# Patient Record
Sex: FEMALE | Race: WHITE | NOT HISPANIC OR LATINO
[De-identification: names, ages, dates, MRNs, and addresses within clinical notes are randomized per-mention and may not be internally consistent; named-entity substitution may affect disease eponyms.]

---

## 2017-03-14 ENCOUNTER — APPOINTMENT (OUTPATIENT)
Dept: NEUROLOGY | Facility: CLINIC | Age: 62
End: 2017-03-14

## 2017-04-03 ENCOUNTER — APPOINTMENT (OUTPATIENT)
Dept: NEUROLOGY | Facility: CLINIC | Age: 62
End: 2017-04-03

## 2017-04-03 VITALS
DIASTOLIC BLOOD PRESSURE: 72 MMHG | HEART RATE: 79 BPM | WEIGHT: 144 LBS | SYSTOLIC BLOOD PRESSURE: 117 MMHG | HEIGHT: 66 IN | BODY MASS INDEX: 23.14 KG/M2

## 2017-04-03 DIAGNOSIS — R13.10 DYSPHAGIA, UNSPECIFIED: ICD-10-CM

## 2017-05-01 ENCOUNTER — OTHER (OUTPATIENT)
Age: 62
End: 2017-05-01

## 2017-06-07 ENCOUNTER — MEDICATION RENEWAL (OUTPATIENT)
Age: 62
End: 2017-06-07

## 2017-07-07 ENCOUNTER — APPOINTMENT (OUTPATIENT)
Dept: NEUROLOGY | Facility: CLINIC | Age: 62
End: 2017-07-07

## 2017-07-07 VITALS
HEART RATE: 86 BPM | SYSTOLIC BLOOD PRESSURE: 129 MMHG | WEIGHT: 137 LBS | DIASTOLIC BLOOD PRESSURE: 66 MMHG | BODY MASS INDEX: 22.02 KG/M2 | HEIGHT: 66 IN

## 2017-08-07 LAB
FOLATE SERPL-MCNC: 13.6 NG/ML
TSH SERPL-ACNC: 4.29 UIU/ML
VIT B12 SERPL-MCNC: 849 PG/ML

## 2017-11-07 ENCOUNTER — APPOINTMENT (OUTPATIENT)
Dept: NEUROLOGY | Facility: CLINIC | Age: 62
End: 2017-11-07
Payer: COMMERCIAL

## 2017-11-07 PROCEDURE — 99214 OFFICE O/P EST MOD 30 MIN: CPT

## 2017-11-07 RX ORDER — MELOXICAM 15 MG/1
15 TABLET ORAL
Qty: 30 | Refills: 0 | Status: DISCONTINUED | COMMUNITY
Start: 2017-05-10 | End: 2017-11-07

## 2018-01-18 ENCOUNTER — OTHER (OUTPATIENT)
Age: 63
End: 2018-01-18

## 2018-04-11 ENCOUNTER — CLINICAL ADVICE (OUTPATIENT)
Age: 63
End: 2018-04-11

## 2018-05-11 ENCOUNTER — APPOINTMENT (OUTPATIENT)
Dept: NEUROLOGY | Facility: CLINIC | Age: 63
End: 2018-05-11
Payer: COMMERCIAL

## 2018-05-11 DIAGNOSIS — G31.9 DEGENERATIVE DISEASE OF NERVOUS SYSTEM, UNSPECIFIED: ICD-10-CM

## 2018-05-11 PROCEDURE — 99214 OFFICE O/P EST MOD 30 MIN: CPT | Mod: 25

## 2018-05-11 PROCEDURE — 64611 CHEMODENERV SALIV GLANDS: CPT

## 2018-05-25 ENCOUNTER — TRANSCRIPTION ENCOUNTER (OUTPATIENT)
Age: 63
End: 2018-05-25

## 2018-06-04 DIAGNOSIS — M54.5 LOW BACK PAIN: ICD-10-CM

## 2018-07-03 ENCOUNTER — APPOINTMENT (OUTPATIENT)
Dept: NEUROLOGY | Facility: CLINIC | Age: 63
End: 2018-07-03
Payer: COMMERCIAL

## 2018-07-03 PROCEDURE — 99214 OFFICE O/P EST MOD 30 MIN: CPT

## 2018-11-05 ENCOUNTER — MEDICATION RENEWAL (OUTPATIENT)
Age: 63
End: 2018-11-05

## 2018-11-05 ENCOUNTER — APPOINTMENT (OUTPATIENT)
Dept: NEUROLOGY | Facility: CLINIC | Age: 63
End: 2018-11-05
Payer: COMMERCIAL

## 2018-11-05 VITALS
WEIGHT: 126 LBS | HEART RATE: 80 BPM | DIASTOLIC BLOOD PRESSURE: 75 MMHG | HEIGHT: 66 IN | SYSTOLIC BLOOD PRESSURE: 125 MMHG | BODY MASS INDEX: 20.25 KG/M2

## 2018-11-05 PROCEDURE — 99214 OFFICE O/P EST MOD 30 MIN: CPT

## 2019-03-27 ENCOUNTER — APPOINTMENT (OUTPATIENT)
Dept: OPHTHALMOLOGY | Facility: CLINIC | Age: 64
End: 2019-03-27
Payer: COMMERCIAL

## 2019-03-27 ENCOUNTER — APPOINTMENT (OUTPATIENT)
Dept: NEUROLOGY | Facility: CLINIC | Age: 64
End: 2019-03-27
Payer: COMMERCIAL

## 2019-03-27 VITALS
HEIGHT: 66 IN | SYSTOLIC BLOOD PRESSURE: 134 MMHG | BODY MASS INDEX: 20.73 KG/M2 | HEART RATE: 91 BPM | WEIGHT: 129 LBS | DIASTOLIC BLOOD PRESSURE: 79 MMHG

## 2019-03-27 DIAGNOSIS — K11.7 DISTURBANCES OF SALIVARY SECRETION: ICD-10-CM

## 2019-03-27 DIAGNOSIS — F41.8 OTHER SPECIFIED ANXIETY DISORDERS: ICD-10-CM

## 2019-03-27 PROCEDURE — 99214 OFFICE O/P EST MOD 30 MIN: CPT

## 2019-03-27 PROCEDURE — ZZZZZ: CPT

## 2019-03-27 PROCEDURE — 92083 EXTENDED VISUAL FIELD XM: CPT

## 2019-03-27 PROCEDURE — 99204 OFFICE O/P NEW MOD 45 MIN: CPT

## 2019-03-27 RX ORDER — RIVASTIGMINE TARTRATE 1.5 MG/1
1.5 CAPSULE ORAL
Qty: 60 | Refills: 5 | Status: DISCONTINUED | COMMUNITY
Start: 2018-11-05 | End: 2019-03-27

## 2019-03-27 RX ORDER — BUPROPION HYDROCHLORIDE 150 MG/1
150 TABLET, FILM COATED, EXTENDED RELEASE ORAL
Qty: 60 | Refills: 5 | Status: DISCONTINUED | COMMUNITY
Start: 2018-07-03 | End: 2019-03-27

## 2019-03-27 NOTE — DISCUSSION/SUMMARY
[FreeTextEntry1] : In summary, the patient is a 63-year-old right-handed woman with a 5 year history of poor balance and falling. The examination was significant for minimal symmetric parkinsonism, minimal ataxia, except for cerebellar gait, and mildly slurred speech. 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 Other considerations include paraneoplastic cerebellar atrophy, vitamin E deficiency, and possible spinocerebellar ataxia, given the hyperreflexia, but all workup so negative. . Paraneoplastic panel was negative, vitamine E was normal, CT of body was normal, and she gets regular mammograms (family hx of breast CT). Time course also argues against paraneoplastic.\par \par 1. Continue PT for balance, speech therapy\par 2. Genetics referral\par 3. Neuropsychological testing done.\par 4. Follow up on long-term hx of thyroid cancer.

## 2019-03-27 NOTE — HISTORY OF PRESENT ILLNESS
[FreeTextEntry1] : The patient is a 64-year-old right-handed woman who comes in for another opinion on cerebellar atrophy and imbalance. Her problems began about 5 years ago when she fell down the stairs and broke her ankle. Since then, she has had frequent falls, in all directions. These are not preceded by lightheadedness or loss of consciousness. Indeed, she does not have lightheadedness when standing up. Her gait has become more wide based. Recently her speech has become slurred though she has no dysphagia. Handwriting is poor, but she has no trouble with eating, dressing, or other activities of daily living. Her legs shake when she is nervous but there is no other tremor. She has no numbness or weakness. She does not act out her dreams but does yell and gasp before those yells. She has no constipation and no urinary incontinence, except mild urgency. She has poor night vision. She has no heart disease or diabetes.\par She has a nephew with multiple sclerosis but no family history of cerebellar disease or other movement disorders.\par \par Additional history - aunt noticed wide-based gait many years ago\par \par 1. Radha scan 6/15/16 normal, I reviewed images. Vitamin E was normal, Mammogram 4/5/16, has Rx to repeat - normal. CT chest/abd/pelvis unremarkable, Paraneoplastic panel 10/24/15 - negative.  SPEP, endomysial antibodias, tTG, PTH normal. Speech is more impaired. No orthostasis. No incontinence. More coughing with food/drinks. Had evaluation, no aspiration.  Getting speech therapy. More trouble without walking. \par 2. Saw Dr Meza - sleep study at [place] consistent with RBD, repeat scheduled.. Never took anything. Daughter hears her yelling. Repeat study (Dr Meza) did not show RBD, perhaps mild sleep apnea. Saw Dr Arellano today, vision issues not related to her cerebellum.\par 3. She is having vision issues, switched paxil to welbutrin, did not change vision, and wellbutrin did not help, switched back to paxil. Had seen an eye doctor.  Mood better on paxil  , tried to wean off, but got more depressed, back on 20mg. Weaned off again, no more depression.\par 4No hallucinations. Daughter thinks she is having more trouble in telling stories (leaves out details). Had neuropsychological testing. Tried rivastigmine,. no difference\par 5.. Wearing a vest now which helps with balance\par

## 2019-03-27 NOTE — PHYSICAL EXAM
[Person] : oriented to person [Place] : oriented to place [Time] : oriented to time [Registration Intact] : recent registration memory intact [Concentration Intact] : normal concentrating ability [Naming Objects] : no difficulty naming common objects [Fluency] : fluency intact [Comprehension] : comprehension intact [Reading] : reading intact [Cranial Nerves Optic (II)] : visual acuity intact bilaterally,  visual fields full to confrontation, pupils equal round and reactive to light [Cranial Nerves Oculomotor (III)] : extraocular motion intact [Cranial Nerves Trigeminal (V)] : facial sensation intact symmetrically [Cranial Nerves Facial (VII)] : face symmetrical [Cranial Nerves Vestibulocochlear (VIII)] : hearing was intact bilaterally [Cranial Nerves Glossopharyngeal (IX)] : tongue and palate midline [Cranial Nerves Accessory (XI - Cranial And Spinal)] : head turning and shoulder shrug symmetric [Cranial Nerves Hypoglossal (XII)] : there was no tongue deviation with protrusion [Motor Tone] : muscle tone was normal in all four extremities [Motor Strength] : muscle strength was normal in all four extremities [Motor Handedness Right-Handed] : the patient is right hand dominant [Sensation Tactile Decrease] : light touch was intact [Dysdiadochokinesia Bilaterally] : present bilaterally [Dysdiadochokinesia On The Left] : present on the left side [Coordination - Dysmetria Impaired Finger-to-Nose Left Only] : present on the left side [1+] : Brachioradialis left 1+ [2+] : Ankle jerk left 2+ [Short Term Intact] : short term memory impaired [Paresis Pronator Drift Right-Sided] : no pronator drift on the right [Paresis Pronator Drift Left-Sided] : no pronator drift on the left [Dysdiadochokinesia On The Right] : not present on the ride side [Coordination - Dysmetria Impaired Finger-to-Nose Bilateral] : not present [Coordination - Dysmetria Impaired Heel-to-Shin Bilateral] : not present [Plantar Reflex Right Only] : normal on the right [Plantar Reflex Left Only] : normal on the left [FreeTextEntry4] : 2/3 delayed recall [FreeTextEntry1] : She has mild hypomimia, but normal volume of speech. Her voice was slightly slurred and monotonous. Extraocular movements were intact with normal saccade, smooth pursuits, no nystagmus, and no square wave jerks seen. Tone was normal at neck and bilateral upper and lower extremities. Rapid alternating movements are slightly irregular, and  finger tapping was bilaterally slowed. Foot tap was irregular. Dysdiadochokinesia on left. Mild ataxia on FNF and bilateral HTS, worse on left. She had no tremor. She was able to get out of the chair without using her arms. Gait was wide based, and she was unable to tandem walk. Romberg was positive. Pull test negative today, but some retropulsion. She had no myoclonus and no chorea\par\par\par  [FreeTextEntry8] : Gait wide-based

## 2019-03-27 NOTE — REASON FOR VISIT
[Follow-Up: _____] : a [unfilled] follow-up visit [Family Member] : family member [FreeTextEntry1] : for ataxia

## 2019-07-10 ENCOUNTER — APPOINTMENT (OUTPATIENT)
Dept: PEDIATRIC MEDICAL GENETICS | Facility: CLINIC | Age: 64
End: 2019-07-10
Payer: MEDICARE

## 2019-07-10 VITALS
WEIGHT: 132.28 LBS | DIASTOLIC BLOOD PRESSURE: 72 MMHG | HEIGHT: 65.98 IN | BODY MASS INDEX: 21.26 KG/M2 | SYSTOLIC BLOOD PRESSURE: 124 MMHG

## 2019-07-10 PROCEDURE — 36415 COLL VENOUS BLD VENIPUNCTURE: CPT

## 2019-07-10 PROCEDURE — 99205 OFFICE O/P NEW HI 60 MIN: CPT

## 2019-07-10 RX ORDER — NAPROXEN 500 MG/1
500 TABLET ORAL
Qty: 20 | Refills: 0 | Status: DISCONTINUED | COMMUNITY
Start: 2018-04-06 | End: 2019-07-10

## 2019-07-10 RX ORDER — HYDROXYZINE HYDROCHLORIDE 25 MG/1
25 TABLET ORAL
Qty: 30 | Refills: 0 | Status: DISCONTINUED | COMMUNITY
Start: 2019-03-12 | End: 2019-07-10

## 2019-07-10 RX ORDER — ATROPINE SULFATE 10 MG/ML
1 SOLUTION OPHTHALMIC
Qty: 30 | Refills: 5 | Status: DISCONTINUED | COMMUNITY
Start: 2017-11-07 | End: 2019-07-10

## 2019-07-10 RX ORDER — CLOTRIMAZOLE 10 MG/G
1 CREAM TOPICAL
Qty: 30 | Refills: 0 | Status: DISCONTINUED | COMMUNITY
Start: 2019-01-24 | End: 2019-07-10

## 2019-07-10 RX ORDER — HYDROXYZINE PAMOATE 25 MG/1
25 CAPSULE ORAL
Qty: 20 | Refills: 0 | Status: DISCONTINUED | COMMUNITY
Start: 2019-01-24 | End: 2019-07-10

## 2019-07-11 NOTE — HISTORY OF PRESENT ILLNESS
[FreeTextEntry1] : Ms. Loco's neurological issues started at around age 57, when she fell down the stairs and broke her ankle.  She needed a plate in her left leg.  Since then she reports frequent falls, and her gait has become more wide-based.  She has short spells of dizziness and can lose her balance if turning quickly. She has broken ribs in falls.  Her speech has been slurring and she has had increasing problems with choking on food.  She has been followed by the movement disorder specialists, who have also noted minimal symmetric Parkinsonism.  She is currently doing PT and ST.  She was seen by Dr. Morrow in Neuro-Ophthalmology who noted a generally normal neuro-ophthalmologic exam, with end-gaze horizontal nystagmus on far left/right gazes due to cerebellar atrophy.  Ms. Loco describes her balance problems as getting progressively worse.  However, she has recently been given a weighted vest, and she feels that this is helping.  She notes that her symptoms seem to get worse in the evening.  She can also have trouble with double vision and nystagmus.  Ms. Loco notes some loss of cognitive abilities as well, but feels the motor problems are the major issue.  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  A thyroid tumor was removed around 1980, along with part of the thyroid.

## 2019-07-11 NOTE — FAMILY HISTORY
[FreeTextEntry1] : Parents are non-consanguineous.  Father was of [place] ancestry and mother of [place], non-Shinto ancestry and some  (CanCount includes the [facility] [place]). They are both .  Father  of prostate cancer at 75  and mother  at 95.  She had breast cancer diagnosed in her 80s.  Paternal uncle had ALS.  One of her sisters has MS and another had breast cancer at 39.  A third sister has a son with MS.  Ms. Loco has 2 daughters ages 35 and 34 who are healthy.  There is no family history of ataxia or cerebellar atrophy.

## 2019-07-11 NOTE — SOCIAL HISTORY
[de-identified] : Lives alone, but joins friend/partner in the evening and spends the night. [FreeTextEntry2] : Former family - retired after being unable to requalify for firearm.

## 2019-07-11 NOTE — REVIEW OF SYSTEMS
[Nl] : Genitourinary [NI] : Endocrine [Difficulties in Speech] : speech difficulties [Ataxia] : ataxia [Frequent Falls] : frequent falls [Smokers in Home] : no one in home smokes [FreeTextEntry2] : Reading glasses. Nystagmus. Occasional diplopia.

## 2019-07-11 NOTE — CONSULT LETTER
[Dear  ___] : Dear  [unfilled], [Consult Letter:] : I had the pleasure of evaluating your patient, [unfilled]. [Please see my note below.] : Please see my note below. [Sincerely,] : Sincerely, [Consult Closing:] : Thank you very much for allowing me to participate in the care of this patient.  If you have any questions, please do not hesitate to contact me. [FreeTextEntry3] : Thang Brush MD, PhD\par Clinical \par [facility], Division of Medical Genetics and Human Genomics\par \par

## 2019-07-11 NOTE — PHYSICAL EXAM
[Normal] : without joint laxity or contractures [Cranial Nerves] : cranial nerves are normal [Ankle Clonus] : no ankle clonus [Alert] : alert [Active] : active [In no acute distress] :  in no acute distress [Muscle tone/ strength] : muscle tone/ strength is normal [Romberg Sign] : Romberg sign was present [Tandem Gait] : a tandem gait abnormalities [de-identified] : Scar on neck from partial thyroidectomy [de-identified] : HC: 58.0 cm (>97th%). [de-identified] : No nystagmus noted. Left eyelid irritation. [de-identified] : Mild problems with fine motor coordination, finger tapping.  Difficulty with finger to nose.  Mild tremor with intention, no tremor at rest.  Mild difficulty with rapid alternating movement. Patellar reflex 3+, remainder of DTRs 2+. Dizziness standing with eyes closed.  Unable to walk in place with eyes closed.  Gait-takes short steps with wide-base.  Able to stand on toes or heels but unable to perform toe or heel walking.  Unable to perform tandem gait ( she states never able to). [FreeTextEntry1] : Genital exam deferred. [de-identified] : Unable to perform serial 7s but can perform serial 3s.

## 2019-07-11 NOTE — REASON FOR VISIT
[Initial - Scheduled] : [unfilled]  is being seen for  ~M an initial scheduled visit [Other: _____] : [unfilled] [FreeTextEntry3] : for cerebellar atrophy and imbalance in this 64 year old female. Genetic counselor, Britta Howell, was present for the evaluation.

## 2019-07-12 ENCOUNTER — APPOINTMENT (OUTPATIENT)
Dept: MAMMOGRAPHY | Facility: CLINIC | Age: 64
End: 2019-07-12
Payer: MEDICARE

## 2019-07-12 PROCEDURE — 77067 SCR MAMMO BI INCL CAD: CPT

## 2019-07-12 PROCEDURE — 77063 BREAST TOMOSYNTHESIS BI: CPT

## 2019-07-16 LAB
MISCELLANEOUS TEST: NORMAL
PROC NAME: NORMAL

## 2019-08-02 LAB
MISCELLANEOUS TEST: NORMAL
PROC NAME: NORMAL

## 2019-08-07 ENCOUNTER — APPOINTMENT (OUTPATIENT)
Dept: RADIOLOGY | Facility: CLINIC | Age: 64
End: 2019-08-07
Payer: MEDICARE

## 2019-08-07 ENCOUNTER — RX RENEWAL (OUTPATIENT)
Age: 64
End: 2019-08-07

## 2019-08-07 PROCEDURE — 77080 DXA BONE DENSITY AXIAL: CPT

## 2019-08-20 ENCOUNTER — APPOINTMENT (OUTPATIENT)
Dept: NEUROLOGY | Facility: CLINIC | Age: 64
End: 2019-08-20
Payer: MEDICARE

## 2019-08-20 VITALS
HEIGHT: 65 IN | SYSTOLIC BLOOD PRESSURE: 126 MMHG | DIASTOLIC BLOOD PRESSURE: 73 MMHG | WEIGHT: 135 LBS | BODY MASS INDEX: 22.49 KG/M2 | HEART RATE: 70 BPM

## 2019-08-20 PROCEDURE — 99214 OFFICE O/P EST MOD 30 MIN: CPT

## 2019-08-20 NOTE — PHYSICAL EXAM
[Person] : oriented to person [Place] : oriented to place [Time] : oriented to time [Concentration Intact] : normal concentrating ability [Registration Intact] : recent registration memory intact [Naming Objects] : no difficulty naming common objects [Fluency] : fluency intact [Reading] : reading intact [Comprehension] : comprehension intact [Cranial Nerves Optic (II)] : visual acuity intact bilaterally,  visual fields full to confrontation, pupils equal round and reactive to light [Cranial Nerves Oculomotor (III)] : extraocular motion intact [Cranial Nerves Trigeminal (V)] : facial sensation intact symmetrically [Cranial Nerves Facial (VII)] : face symmetrical [Cranial Nerves Vestibulocochlear (VIII)] : hearing was intact bilaterally [Cranial Nerves Accessory (XI - Cranial And Spinal)] : head turning and shoulder shrug symmetric [Cranial Nerves Glossopharyngeal (IX)] : tongue and palate midline [Motor Tone] : muscle tone was normal in all four extremities [Motor Strength] : muscle strength was normal in all four extremities [Cranial Nerves Hypoglossal (XII)] : there was no tongue deviation with protrusion [Motor Handedness Right-Handed] : the patient is right hand dominant [Sensation Tactile Decrease] : light touch was intact [Dysdiadochokinesia Bilaterally] : present bilaterally [Dysdiadochokinesia On The Left] : present on the left side [Coordination - Dysmetria Impaired Finger-to-Nose Left Only] : present on the left side [1+] : Brachioradialis right 1+ [2+] : Ankle jerk left 2+ [Short Term Intact] : short term memory intact [Paresis Pronator Drift Right-Sided] : no pronator drift on the right [Paresis Pronator Drift Left-Sided] : no pronator drift on the left [Dysdiadochokinesia On The Right] : not present on the ride side [Coordination - Dysmetria Impaired Finger-to-Nose Bilateral] : not present [Plantar Reflex Left Only] : normal on the left [Coordination - Dysmetria Impaired Heel-to-Shin Bilateral] : not present [Plantar Reflex Right Only] : normal on the right [FreeTextEntry1] : She has mild hypomimia, but normal volume of speech. Her voice was slightly slurred and monotonous. Extraocular movements were intact with normal saccade, smooth pursuits, no nystagmus, and no square wave jerks seen. Tone was normal at neck and bilateral upper and lower extremities. Rapid alternating movements are slightly irregular, and  finger tapping was bilaterally slowed. Foot tap was irregular. Dysdiadochokinesia on left. Mild ataxia on FNF and bilateral HTS. She had no tremor. She was able to get out of the chair without using her arms. Gait was wide based, and she was unable to tandem walk. Romberg was negative, but swayed. Pull test negative.\par  [FreeTextEntry8] : Gait wide-based

## 2019-08-20 NOTE — DISCUSSION/SUMMARY
[FreeTextEntry1] : In summary, the patient is a 63-year-old right-handed woman with a 5 year history of poor balance and falling. The examination was significant for minimal symmetric parkinsonism, minimal ataxia, except for cerebellar gait, and mildly slurred speech. 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 Other considerations include paraneoplastic cerebellar atrophy, vitamin E deficiency, and possible spinocerebellar ataxia, given the hyperreflexia, but all workup so negative. . Paraneoplastic panel was negative, vitamine E was normal, CT of body was normal, and she gets regular mammograms (family hx of breast CT). Time course also argues against paraneoplastic.\par \par 1. Continue PT for balance, speech therapy\par 2. Genetics referral revealed no additional disease so far, SCA testing perhaps\par

## 2019-08-20 NOTE — HISTORY OF PRESENT ILLNESS
[FreeTextEntry1] : The patient is a 64-year-old right-handed woman who comes in for another opinion on cerebellar atrophy and imbalance. Her problems began about 5 years ago when she fell down the stairs and broke her ankle. Since then, she has had frequent falls, in all directions. These are not preceded by lightheadedness or loss of consciousness. Indeed, she does not have lightheadedness when standing up. Her gait has become more wide based. Recently her speech has become slurred though she has no dysphagia. Handwriting is poor, but she has no trouble with eating, dressing, or other activities of daily living. Her legs shake when she is nervous but there is no other tremor. She has no numbness or weakness. She does not act out her dreams but does yell and gasp before those yells. She has no constipation and no urinary incontinence, except mild urgency. She has poor night vision. She has no heart disease or diabetes.\par She has a nephew with multiple sclerosis but no family history of cerebellar disease or other movement disorders.\par \par Additional history - aunt noticed wide-based gait many years ago\par \par 1. Saw genetics. Had testing for Friedreich's ataxia, lysosomal storages disease were normal. Possibly, will have SCAs tested. \par 2. Prior to that, Radha scan 6/15/16 normal, I reviewed images. Vitamin E was normal, Mammogram 4/5/16, has Rx to repeat - normal. CT chest/abd/pelvis unremarkable, Paraneoplastic panel 10/24/15 - negative.  SPEP, endomysial antibodies, tTG, PTH normal. Speech is more impaired. No orthostasis. No incontinence. More coughing with food/drinks. Had evaluation, no aspiration.  Getting speech therapy. More trouble without walking. \par 3. Saw Dr Meza - sleep study at [place] consistent with RBD, repeat scheduled.. Never took anything. Daughter hears her yelling. Repeat study (Dr Meza) did not show RBD, perhaps mild sleep apnea. Saw Dr Arellano, vision issues not related to her cerebellum.\par 4. Mood better on paxil  , tried to wean off, but got more depressed, back on 20mg. Weaned off again, no more depression. Mirtazapine helps with sleep and mood. No hallucinations. Daughter thinks she is having more trouble in telling stories (leaves out details). Had neuropsychological testing. Tried rivastigmine,. no difference\par 5. Wearing a vest now which helps with balance (during PT). Balance is worse. \par \par

## 2019-09-05 ENCOUNTER — RX RENEWAL (OUTPATIENT)
Age: 64
End: 2019-09-05

## 2019-10-21 ENCOUNTER — MEDICATION RENEWAL (OUTPATIENT)
Age: 64
End: 2019-10-21

## 2020-03-10 ENCOUNTER — APPOINTMENT (OUTPATIENT)
Dept: NEUROLOGY | Facility: CLINIC | Age: 65
End: 2020-03-10
Payer: MEDICARE

## 2020-03-10 DIAGNOSIS — G47.52 REM SLEEP BEHAVIOR DISORDER: ICD-10-CM

## 2020-03-10 PROCEDURE — 99214 OFFICE O/P EST MOD 30 MIN: CPT

## 2020-03-10 NOTE — DISCUSSION/SUMMARY
[FreeTextEntry1] : In summary, the patient is a 64-year-old right-handed woman with a 5 year history of poor balance and falling. The examination was significant for minimal symmetric parkinsonism, minimal ataxia, except for cerebellar gait, and mildly slurred speech. 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 Other considerations include paraneoplastic cerebellar atrophy, vitamin E deficiency, and possible spinocerebellar ataxia, given the hyperreflexia, but all workup so negative. . Paraneoplastic panel was negative, vitamine E was normal, CT of body was normal, and she gets regular mammograms (family hx of breast CT). Time course also argues against paraneoplastic. Exam largely stable\par \par 1. Continue PT for balance, speech therapy\par 2. Genetics referral revealed no additional disease so far, will see if we can get SCA testing\par \par

## 2020-03-10 NOTE — HISTORY OF PRESENT ILLNESS
[FreeTextEntry1] : The patient is a 64-year-old right-handed woman who comes in for another opinion on cerebellar atrophy and imbalance. Her problems began about 5 years ago when she fell down the stairs and broke her ankle. Since then, she has had frequent falls, in all directions. These are not preceded by lightheadedness or loss of consciousness. Indeed, she does not have lightheadedness when standing up. Her gait has become more wide based. Recently her speech has become slurred though she has no dysphagia. Handwriting is poor, but she has no trouble with eating, dressing, or other activities of daily living. Her legs shake when she is nervous but there is no other tremor. She has no numbness or weakness. She does not act out her dreams but does yell and gasp before those yells. She has no constipation and no urinary incontinence, except mild urgency. She has poor night vision. She has no heart disease or diabetes.\par She has a nephew with multiple sclerosis but no family history of cerebellar disease or other movement disorders.\par \par Additional history - aunt noticed wide-based gait many years ago\par \par 1. Last night she tripped, and fell on knee. Uses brace now.\par 2. Saw genetics. Had testing for Friedreich's ataxia, lysosomal storages disease were normal. Possibly, will have SCAs tested. \par 3. Prior to that, Radha scan 6/15/16 normal, I reviewed images. Vitamin E was normal, Mammogram 4/5/16, has Rx to repeat - normal. CT chest/abd/pelvis unremarkable, Paraneoplastic panel 10/24/15 - negative.  SPEP, endomysial antibodies, tTG, PTH normal. Speech is more impaired. No orthostasis. No incontinence. More coughing with food/drinks. Had evaluation, no aspiration.  Getting speech therapy. More trouble without walking. \par 3. Saw Dr Meza - sleep study at [place] consistent with RBD, repeat scheduled.. Never took anything. Daughter hears her yelling. Repeat study (Dr Meza) did not show RBD, perhaps mild sleep apnea. Saw Dr Arellano, vision issues not related to her cerebellum.\par 4. Mood better on paxil , tried to wean off, but got more depressed, back on 20mg. Weaned off again, no more depression. Trazodone helps with sleep and mood. No hallucinations. Daughter thinks she is having more trouble in telling stories (leaves out details). Had neuropsychological testing. \par 5. Wearing a vest now which helps with balance (during PT). Balance is worse. \par \par

## 2020-03-10 NOTE — PHYSICAL EXAM
[Person] : oriented to person [Place] : oriented to place [Time] : oriented to time [Short Term Intact] : short term memory intact [Registration Intact] : recent registration memory intact [Concentration Intact] : normal concentrating ability [Naming Objects] : no difficulty naming common objects [Fluency] : fluency intact [Comprehension] : comprehension intact [Reading] : reading intact [Cranial Nerves Optic (II)] : visual acuity intact bilaterally,  visual fields full to confrontation, pupils equal round and reactive to light [Cranial Nerves Oculomotor (III)] : extraocular motion intact [Cranial Nerves Trigeminal (V)] : facial sensation intact symmetrically [Cranial Nerves Facial (VII)] : face symmetrical [Cranial Nerves Vestibulocochlear (VIII)] : hearing was intact bilaterally [Cranial Nerves Glossopharyngeal (IX)] : tongue and palate midline [Cranial Nerves Accessory (XI - Cranial And Spinal)] : head turning and shoulder shrug symmetric [Cranial Nerves Hypoglossal (XII)] : there was no tongue deviation with protrusion [Motor Tone] : muscle tone was normal in all four extremities [Motor Strength] : muscle strength was normal in all four extremities [Motor Handedness Right-Handed] : the patient is right hand dominant [Sensation Tactile Decrease] : light touch was intact [Dysdiadochokinesia Bilaterally] : present bilaterally [Dysdiadochokinesia On The Left] : present on the left side [Coordination - Dysmetria Impaired Finger-to-Nose Left Only] : present on the left side [1+] : Brachioradialis left 1+ [2+] : Ankle jerk left 2+ [Paresis Pronator Drift Right-Sided] : no pronator drift on the right [Paresis Pronator Drift Left-Sided] : no pronator drift on the left [Dysdiadochokinesia On The Right] : not present on the ride side [Coordination - Dysmetria Impaired Finger-to-Nose Bilateral] : not present [Coordination - Dysmetria Impaired Heel-to-Shin Bilateral] : not present [Plantar Reflex Right Only] : normal on the right [Plantar Reflex Left Only] : normal on the left [FreeTextEntry1] : She has mild hypomimia, but normal volume of speech. Her voice was slightly slurred and monotonous. Extraocular movements were intact with normal saccade, smooth pursuits, no nystagmus, and no square wave jerks seen. Tone was normal at neck and bilateral upper and lower extremities. Rapid alternating movements are slightly irregular, and  finger tapping was bilaterally slowed. Foot tap was irregular. Dysdiadochokinesia on left. \par Ataxia on FNF and bilateral HTS. She had no tremor. She was able to get out of the chair without using her arms. Gait was wide based, and she was unable to tandem walk. Romberg was negative, but swayed. \par Pull test positive.\par  [FreeTextEntry8] : Gait wide-based

## 2020-04-15 ENCOUNTER — RX RENEWAL (OUTPATIENT)
Age: 65
End: 2020-04-15

## 2020-09-08 ENCOUNTER — APPOINTMENT (OUTPATIENT)
Dept: MAMMOGRAPHY | Facility: CLINIC | Age: 65
End: 2020-09-08
Payer: MEDICARE

## 2020-09-08 PROCEDURE — 77067 SCR MAMMO BI INCL CAD: CPT

## 2020-09-08 PROCEDURE — 77063 BREAST TOMOSYNTHESIS BI: CPT

## 2020-09-18 ENCOUNTER — APPOINTMENT (OUTPATIENT)
Dept: NEUROLOGY | Facility: CLINIC | Age: 65
End: 2020-09-18
Payer: MEDICARE

## 2020-09-18 VITALS
BODY MASS INDEX: 21.49 KG/M2 | HEART RATE: 81 BPM | WEIGHT: 129 LBS | SYSTOLIC BLOOD PRESSURE: 118 MMHG | HEIGHT: 65 IN | DIASTOLIC BLOOD PRESSURE: 68 MMHG

## 2020-09-18 VITALS — TEMPERATURE: 95.6 F

## 2020-09-18 PROCEDURE — 99214 OFFICE O/P EST MOD 30 MIN: CPT

## 2020-09-18 NOTE — PHYSICAL EXAM
[Person] : oriented to person [Place] : oriented to place [Time] : oriented to time [Short Term Intact] : short term memory intact [Registration Intact] : recent registration memory intact [Concentration Intact] : normal concentrating ability [Naming Objects] : no difficulty naming common objects [Fluency] : fluency intact [Comprehension] : comprehension intact [Reading] : reading intact [Cranial Nerves Optic (II)] : visual acuity intact bilaterally,  visual fields full to confrontation, pupils equal round and reactive to light [Cranial Nerves Oculomotor (III)] : extraocular motion intact [Cranial Nerves Trigeminal (V)] : facial sensation intact symmetrically [Cranial Nerves Facial (VII)] : face symmetrical [Cranial Nerves Vestibulocochlear (VIII)] : hearing was intact bilaterally [Cranial Nerves Glossopharyngeal (IX)] : tongue and palate midline [Cranial Nerves Accessory (XI - Cranial And Spinal)] : head turning and shoulder shrug symmetric [Cranial Nerves Hypoglossal (XII)] : there was no tongue deviation with protrusion [Motor Tone] : muscle tone was normal in all four extremities [Motor Strength] : muscle strength was normal in all four extremities [Motor Handedness Right-Handed] : the patient is right hand dominant [Paresis Pronator Drift Right-Sided] : no pronator drift on the right [Paresis Pronator Drift Left-Sided] : no pronator drift on the left [Sensation Tactile Decrease] : light touch was intact [Dysdiadochokinesia Bilaterally] : present bilaterally [Dysdiadochokinesia On The Right] : not present on the ride side [Dysdiadochokinesia On The Left] : present on the left side [Coordination - Dysmetria Impaired Finger-to-Nose Bilateral] : not present [Coordination - Dysmetria Impaired Finger-to-Nose Left Only] : present on the left side [Coordination - Dysmetria Impaired Heel-to-Shin Bilateral] : not present [1+] : Brachioradialis left 1+ [2+] : Ankle jerk left 2+ [Plantar Reflex Right Only] : normal on the right [Plantar Reflex Left Only] : normal on the left [FreeTextEntry1] : She has mild hypomimia, but normal volume of speech. Her voice was slightly slurred and monotonous. Extraocular movements were intact with normal saccade, smooth pursuits, no nystagmus, and no square wave jerks seen. Tone was normal at neck and bilateral upper and lower extremities. Rapid alternating movements are slightly irregular, and  finger tapping was bilaterally slowed. Foot tap was irregular. Dysdiadochokinesia on left. \par Ataxia on FNF and bilateral HTS. She had no tremor. \par \par She was able to get out of the chair without using her arms. Gait was wide based, and she was unable to tandem walk. Romberg was negative, but swayed. \par Pull test negative\par  [FreeTextEntry8] : Gait wide-based

## 2020-09-18 NOTE — DISCUSSION/SUMMARY
[FreeTextEntry1] : In summary, the patient is a 64-year-old right-handed woman with a 5 year history of poor balance and falling. The examination was significant for minimal symmetric parkinsonism, minimal ataxia, except for cerebellar gait, and mildly slurred speech. 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 Other considerations include paraneoplastic cerebellar atrophy, vitamin E deficiency, and possible spinocerebellar ataxia, given the hyperreflexia, but all workup so negative. . Paraneoplastic panel was negative, vitamine E was normal, CT of body was normal, and she gets regular mammograms (family hx of breast CT). Time course also argues against paraneoplastic. Exam largely stable\par \par 1. Continue PT for balance, speech therapy\par 2. Genetics referral revealed no additional disease so far, and no family hx has emerged. \par 3. Can increase trazodone for sleep. She has sleep walked in the past on ambien, so will not use again. \par \par We discussed the above impression, plan and recommendations during the visit. Counseling represented more then 50% of the 30 minute visit time

## 2020-09-18 NOTE — HISTORY OF PRESENT ILLNESS
[FreeTextEntry1] : The patient is a 65-year-old right-handed woman who comes in for another opinion on cerebellar atrophy and imbalance. Her problems began about 5 years ago when she fell down the stairs and broke her ankle. Since then, she has had frequent falls, in all directions. These are not preceded by lightheadedness or loss of consciousness. Indeed, she does not have lightheadedness when standing up. Her gait has become more wide based. Recently her speech has become slurred though she has no dysphagia. Handwriting is poor, but she has no trouble with eating, dressing, or other activities of daily living. Her legs shake when she is nervous but there is no other tremor. She has no numbness or weakness. She does not act out her dreams but does yell and gasp before those yells. She has no constipation and no urinary incontinence, except mild urgency. She has poor night vision. She has no heart disease or diabetes.\par She has a nephew with multiple sclerosis but no family history of cerebellar disease or other movement disorders.\par \par Additional history - aunt noticed wide-based gait many years ago\par \par 1. Last night she tripped, and fell on knee. Uses brace now.\par 2. Saw genetics. Had testing for Friedreich's ataxia, lysosomal storages disease were normal. Possibly, will have SCAs tested. but not yet\par 3. Prior to that, Radha scan 6/15/16 normal, I reviewed images. Vitamin E was normal, Mammogram 4/5/16, has Rx to repeat - normal. CT chest/abd/pelvis unremarkable, Paraneoplastic panel 10/24/15 - negative.  SPEP, endomysial antibodies, tTG, PTH normal. No orthostasis. No incontinence. More coughing with food/drinks. Had evaluation, no aspiration. Getting speech therapy. More trouble with walking, fell without vest.\par 3. Saw Dr Meza - sleep study at [place] consistent with RBD, repeat scheduled.. Never took anything. Daughter hears her yelling. Repeat study (Dr Meza) did not show RBD, perhaps mild sleep apnea. Saw Dr Arellano, vision issues not related to her cerebellum.\par 4. Mood better on paxil , tried to wean off, but got more depressed, back on 20mg. Weaned off again, no more depression. Trazodone 100 mg helps with sleep and mood. No hallucinations. Daughter thinks she is having more trouble in telling stories (leaves out details). Had neuropsychological testing. \par 5. Wearing a vest now which helps with balance (during PT). Balance is worse. \par \par

## 2020-10-16 ENCOUNTER — RX RENEWAL (OUTPATIENT)
Age: 65
End: 2020-10-16

## 2020-10-22 PROBLEM — K11.7 SIALORRHEA: Status: ACTIVE | Noted: 2017-11-07

## 2021-01-20 ENCOUNTER — APPOINTMENT (OUTPATIENT)
Dept: NEUROLOGY | Facility: CLINIC | Age: 66
End: 2021-01-20
Payer: MEDICARE

## 2021-01-20 VITALS
DIASTOLIC BLOOD PRESSURE: 73 MMHG | WEIGHT: 130 LBS | BODY MASS INDEX: 21.66 KG/M2 | HEIGHT: 65 IN | SYSTOLIC BLOOD PRESSURE: 116 MMHG | HEART RATE: 94 BPM

## 2021-01-20 DIAGNOSIS — F32.9 MAJOR DEPRESSIVE DISORDER, SINGLE EPISODE, UNSPECIFIED: ICD-10-CM

## 2021-01-20 PROCEDURE — 99214 OFFICE O/P EST MOD 30 MIN: CPT

## 2021-01-20 NOTE — DISCUSSION/SUMMARY
[FreeTextEntry1] : In summary, the patient is a 65-year-old right-handed woman with a 5 year history of poor balance and falling. The examination was significant for minimal symmetric parkinsonism, minimal ataxia, except for cerebellar gait, and mildly slurred speech. 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 Paraneoplastic panel was negative, vitamine E was normal, CT of body was normal, and she gets regular mammograms (family hx of breast CT). Time course also argues against paraneoplastic. Exam largely stable\par \par Overall she is stable now without further progression\par 1. Continue PT for balance, speech therapy\par 2. Genetics referral revealed no additional disease so far, and no family hx has emerged. \par 3. Continue trazodone for sleep and mood. She has sleep walked in the past on ambien, so will not use again. \par \par We discussed the above impression, plan and recommendations during the visit. Counseling represented more then 50% of the 30 minute visit time

## 2021-01-20 NOTE — HISTORY OF PRESENT ILLNESS
[FreeTextEntry1] : The patient is a 65-year-old right-handed woman with cerebellar atrophy and imbalance. Her problems began about 2013 when she fell down the stairs and broke her ankle. \par She has a nephew with multiple sclerosis but no family history of cerebellar disease or other movement disorders, though additional history later revealed that an aunt noticed wide-based gait many years ago\par \par 1. No falls\par 2. Saw genetics. Had testing for Friedreich's ataxia, lysosomal storages disease were normal. Possibly, will have SCAs tested. but not yet.\par 3. Prior to that, Radha scan 6/15/16 normal, I reviewed images. Vitamin E was normal, Mammogram 4/5/16, has Rx to repeat - normal. CT chest/abd/pelvis unremarkable, Paraneoplastic panel 10/24/15 - negative.  SPEP, endomysial antibodies, tTG, PTH normal. No orthostasis. No incontinence. More coughing with food/drinks. Had evaluation, no aspiration. Getting speech therapy. More trouble with walking, fell without vest.\par 3. Saw Dr Meza - sleep study at [place] consistent with RBD, repeat scheduled.. Never took anything. Daughter hears her yelling. Repeat study (Dr Meza) did not show RBD, perhaps mild sleep apnea. \par 4. Mood better on paxil , tried to wean off, but got more depressed. Weaned off again, no more depression. Trazodone 100 mg helps with sleep and mood. No hallucinations. Daughter thinks she is having more trouble in telling stories (leaves out details). Had neuropsychological testing. \par 5. Wearing a vest now which helps with balance (during PT). Balance is worse. Going to speech therapy\par \par

## 2021-01-20 NOTE — PHYSICAL EXAM
[Person] : oriented to person [Place] : oriented to place [Time] : oriented to time [Short Term Intact] : short term memory intact [Registration Intact] : recent registration memory intact [Concentration Intact] : normal concentrating ability [Naming Objects] : no difficulty naming common objects [Fluency] : fluency intact [Comprehension] : comprehension intact [Reading] : reading intact [Cranial Nerves Oculomotor (III)] : extraocular motion intact [Cranial Nerves Optic (II)] : visual acuity intact bilaterally,  visual fields full to confrontation, pupils equal round and reactive to light [Cranial Nerves Trigeminal (V)] : facial sensation intact symmetrically [Cranial Nerves Facial (VII)] : face symmetrical [Cranial Nerves Glossopharyngeal (IX)] : tongue and palate midline [Cranial Nerves Vestibulocochlear (VIII)] : hearing was intact bilaterally [Cranial Nerves Accessory (XI - Cranial And Spinal)] : head turning and shoulder shrug symmetric [Cranial Nerves Hypoglossal (XII)] : there was no tongue deviation with protrusion [Motor Tone] : muscle tone was normal in all four extremities [Motor Strength] : muscle strength was normal in all four extremities [Motor Handedness Right-Handed] : the patient is right hand dominant [Sensation Tactile Decrease] : light touch was intact [Dysdiadochokinesia Bilaterally] : present bilaterally [Dysdiadochokinesia On The Left] : present on the left side [Coordination - Dysmetria Impaired Finger-to-Nose Left Only] : present on the left side [1+] : Brachioradialis left 1+ [2+] : Ankle jerk left 2+ [Paresis Pronator Drift Right-Sided] : no pronator drift on the right [Paresis Pronator Drift Left-Sided] : no pronator drift on the left [Dysdiadochokinesia On The Right] : not present on the ride side [Coordination - Dysmetria Impaired Finger-to-Nose Bilateral] : not present [Coordination - Dysmetria Impaired Heel-to-Shin Bilateral] : not present [Plantar Reflex Right Only] : normal on the right [Plantar Reflex Left Only] : normal on the left [FreeTextEntry4] : Date = 1/12/21. [FreeTextEntry1] : She has mild hypomimia, but normal volume of speech. Her voice was slightly slurred and monotonous. Extraocular movements were intact with normal saccade, smooth pursuits, no nystagmus, and no square wave jerks seen. Tone was normal at neck and bilateral upper and lower extremities. Rapid alternating movements are slightly irregular, and  finger tapping was bilaterally slowed. \par \par Foot tap was irregular. Dysdiadochokinesia on left. Ataxia on FNF and bilateral HTS. She had no tremor. \par \par She was able to get out of the chair without using her arms. Gait was wide based, and she was unable to tandem walk. Romberg was negative, but swayed. Pull test negative\par  [FreeTextEntry8] : Gait wide-based

## 2021-03-22 ENCOUNTER — NON-APPOINTMENT (OUTPATIENT)
Age: 66
End: 2021-03-22

## 2021-03-26 ENCOUNTER — APPOINTMENT (OUTPATIENT)
Dept: OPHTHALMOLOGY | Facility: CLINIC | Age: 66
End: 2021-03-26
Payer: MEDICARE

## 2021-03-26 ENCOUNTER — NON-APPOINTMENT (OUTPATIENT)
Age: 66
End: 2021-03-26

## 2021-03-26 PROCEDURE — 92014 COMPRE OPH EXAM EST PT 1/>: CPT

## 2021-03-26 PROCEDURE — 92015 DETERMINE REFRACTIVE STATE: CPT

## 2021-04-28 ENCOUNTER — RX RENEWAL (OUTPATIENT)
Age: 66
End: 2021-04-28

## 2021-06-11 ENCOUNTER — APPOINTMENT (OUTPATIENT)
Dept: NEUROLOGY | Facility: CLINIC | Age: 66
End: 2021-06-11
Payer: MEDICARE

## 2021-06-11 VITALS — DIASTOLIC BLOOD PRESSURE: 61 MMHG | HEART RATE: 91 BPM | SYSTOLIC BLOOD PRESSURE: 102 MMHG

## 2021-06-11 VITALS — DIASTOLIC BLOOD PRESSURE: 70 MMHG | HEART RATE: 97 BPM | SYSTOLIC BLOOD PRESSURE: 110 MMHG

## 2021-06-11 DIAGNOSIS — G23.8 OTHER SPECIFIED DEGENERATIVE DISEASES OF BASAL GANGLIA: ICD-10-CM

## 2021-06-11 DIAGNOSIS — G31.9 DEGENERATIVE DISEASE OF NERVOUS SYSTEM, UNSPECIFIED: ICD-10-CM

## 2021-06-11 PROCEDURE — 99214 OFFICE O/P EST MOD 30 MIN: CPT

## 2021-06-11 RX ORDER — MIRTAZAPINE 15 MG/1
15 TABLET, FILM COATED ORAL
Qty: 60 | Refills: 5 | Status: DISCONTINUED | COMMUNITY
Start: 2019-03-27 | End: 2021-06-11

## 2021-06-11 NOTE — HISTORY OF PRESENT ILLNESS
[FreeTextEntry1] : The patient is a 66-year-old right-handed woman with cerebellar atrophy and imbalance. Her problems began about 2013 when she fell down the stairs and broke her ankle. \par She has a nephew with multiple sclerosis but no family history of cerebellar disease or other movement disorders, though additional history later revealed that an aunt noticed wide-based gait many years ago\par \par 1. No falls\par 2. Saw genetics. Had testing for Friedreich's ataxia, lysosomal storages disease were normal. SCA28, DRPLA, ANO10, ESTEBAN, SCA3, ADCK3 negative, Indeed, comprehensive ataxia evaluation in 10/2019 was normal. \par 3. Prior to that, Radha scan 6/15/16 normal, I reviewed images. Vitamin E was normal, Mammogram normal, and gets them regularly. CT chest/abd/pelvis unremarkable, Paraneoplastic panel 10/24/15 - negative.  SPEP, endomysial antibodies, tTG, PTH normal. No orthostasis. No incontinence. Was coughing with food/drinks, had evaluation, no aspiration. Getting speech therapy. More trouble with walking, fell without vest.\par 3. Saw Dr Meza - sleep study at [facility] consistent with RBD, repeat scheduled.. Never took anything. Daughter hears her yelling. Repeat study with Dr Meza did not show RBD, perhaps mild sleep apnea. \par 4. Mood was improved on paxil , tried to wean off, but got more depressed. Weaned off again, no more depression or anxiety. Trazodone 100 mg helps with sleep and mood. No hallucinations. Daughter thinks she is having more trouble in telling stories (leaves out details). \par 5. Wearing a vest now which helps with balance (during PT). Going to speech therapy\par 6. PT noticed ptosis. \par

## 2021-06-11 NOTE — DISCUSSION/SUMMARY
[FreeTextEntry1] : In summary, the patient is a 66-year-old right-handed woman with a history of poor balance and falling. The examination was significant for minimal symmetric parkinsonism, minimal ataxia, except for cerebellar gait, and mildly slurred speech. 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 Paraneoplastic panel was negative, vitamine E was normal, CT of body was normal, and she gets regular mammograms (family hx of breast CT). Time course also argues against paraneoplastic. Genetic testing unrevealing. \par \par \par \par Overall she is stable now without clear further progression\par 1. Continue PT for balance, speech therapy\par 2. Genetics referral revealed no mutations, and no family hx has emerged. \par 3. Continue trazodone for sleep and mood. She has sleep walked in the past on ambien, so will not use again. \par \par We discussed the above impression, plan and recommendations during the visit. Counseling represented more then 50% of the 30 minute visit time

## 2021-06-11 NOTE — PHYSICAL EXAM
[Person] : oriented to person [Place] : oriented to place [Time] : oriented to time [Short Term Intact] : short term memory intact [Registration Intact] : recent registration memory intact [Concentration Intact] : normal concentrating ability [Naming Objects] : no difficulty naming common objects [Fluency] : fluency intact [Comprehension] : comprehension intact [Reading] : reading intact [Cranial Nerves Optic (II)] : visual acuity intact bilaterally,  visual fields full to confrontation, pupils equal round and reactive to light [Cranial Nerves Oculomotor (III)] : extraocular motion intact [Cranial Nerves Trigeminal (V)] : facial sensation intact symmetrically [Cranial Nerves Facial (VII)] : face symmetrical [Cranial Nerves Vestibulocochlear (VIII)] : hearing was intact bilaterally [Cranial Nerves Glossopharyngeal (IX)] : tongue and palate midline [Cranial Nerves Accessory (XI - Cranial And Spinal)] : head turning and shoulder shrug symmetric [Cranial Nerves Hypoglossal (XII)] : there was no tongue deviation with protrusion [Motor Strength] : muscle strength was normal in all four extremities [Motor Tone] : muscle tone was normal in all four extremities [Motor Handedness Right-Handed] : the patient is right hand dominant [Sensation Tactile Decrease] : light touch was intact [Dysdiadochokinesia Bilaterally] : present bilaterally [Dysdiadochokinesia On The Left] : present on the left side [Coordination - Dysmetria Impaired Finger-to-Nose Left Only] : present on the left side [1+] : Brachioradialis left 1+ [2+] : Ankle jerk left 2+ [Paresis Pronator Drift Right-Sided] : no pronator drift on the right [Paresis Pronator Drift Left-Sided] : no pronator drift on the left [Dysdiadochokinesia On The Right] : not present on the ride side [Coordination - Dysmetria Impaired Finger-to-Nose Bilateral] : not present [Plantar Reflex Right Only] : normal on the right [Plantar Reflex Left Only] : normal on the left [FreeTextEntry4] : Date = 1/12/21. [FreeTextEntry1] : She has mild hypomimia, but normal volume of speech. Her voice was slightly slurred and monotonous. Extraocular movements were intact with slowed saccades, smooth pursuits, mild endgaze nystagmus, and no square wave jerks seen. Tone was normal at neck and bilateral upper and lower extremities. Rapid alternating movements are slightly irregular, and  finger tapping was bilaterally slowed. \par \par Foot tap was irregular. Dysdiadochokinesia on left. Ataxia on FNF and bilateral HTS. She had no tremor. \par \par She was able to get out of the chair without using her arms. Gait was wide based, and she was unable to tandem walk. Romberg was negative, but swayed. Pull test negative\par  [FreeTextEntry8] : Gait wide-based. No ataxia on FNF

## 2021-09-17 ENCOUNTER — APPOINTMENT (OUTPATIENT)
Dept: RADIOLOGY | Facility: CLINIC | Age: 66
End: 2021-09-17
Payer: MEDICARE

## 2021-09-17 ENCOUNTER — APPOINTMENT (OUTPATIENT)
Dept: ULTRASOUND IMAGING | Facility: CLINIC | Age: 66
End: 2021-09-17

## 2021-09-17 ENCOUNTER — APPOINTMENT (OUTPATIENT)
Dept: MAMMOGRAPHY | Facility: CLINIC | Age: 66
End: 2021-09-17
Payer: MEDICARE

## 2021-09-17 PROCEDURE — 77063 BREAST TOMOSYNTHESIS BI: CPT

## 2021-09-17 PROCEDURE — 77067 SCR MAMMO BI INCL CAD: CPT

## 2021-09-17 PROCEDURE — 76641 ULTRASOUND BREAST COMPLETE: CPT | Mod: 50

## 2021-09-17 PROCEDURE — 77080 DXA BONE DENSITY AXIAL: CPT

## 2021-11-03 PROCEDURE — 72100 X-RAY EXAM L-S SPINE 2/3 VWS: CPT

## 2021-11-16 ENCOUNTER — RX RENEWAL (OUTPATIENT)
Age: 66
End: 2021-11-16

## 2021-12-28 ENCOUNTER — APPOINTMENT (OUTPATIENT)
Dept: NEUROLOGY | Facility: CLINIC | Age: 66
End: 2021-12-28

## 2022-04-26 ENCOUNTER — APPOINTMENT (OUTPATIENT)
Dept: NEUROLOGY | Facility: CLINIC | Age: 67
End: 2022-04-26
Payer: MEDICARE

## 2022-04-26 VITALS
BODY MASS INDEX: 21.66 KG/M2 | HEIGHT: 65 IN | HEART RATE: 92 BPM | SYSTOLIC BLOOD PRESSURE: 111 MMHG | OXYGEN SATURATION: 97 % | DIASTOLIC BLOOD PRESSURE: 65 MMHG | TEMPERATURE: 98 F | WEIGHT: 130 LBS

## 2022-04-26 DIAGNOSIS — G47.52 REM SLEEP BEHAVIOR DISORDER: ICD-10-CM

## 2022-04-26 DIAGNOSIS — G11.9 HEREDITARY ATAXIA, UNSPECIFIED: ICD-10-CM

## 2022-04-26 PROCEDURE — 99214 OFFICE O/P EST MOD 30 MIN: CPT

## 2022-04-26 NOTE — PHYSICAL EXAM
[Person] : oriented to person [Place] : oriented to place [Registration Intact] : recent registration memory intact [Concentration Intact] : normal concentrating ability [Naming Objects] : no difficulty naming common objects [Fluency] : fluency intact [Cranial Nerves Optic (II)] : visual acuity intact bilaterally,  visual fields full to confrontation, pupils equal round and reactive to light [Cranial Nerves Oculomotor (III)] : extraocular motion intact [Cranial Nerves Trigeminal (V)] : facial sensation intact symmetrically [Cranial Nerves Facial (VII)] : face symmetrical [Cranial Nerves Vestibulocochlear (VIII)] : hearing was intact bilaterally [Cranial Nerves Glossopharyngeal (IX)] : tongue and palate midline [Cranial Nerves Accessory (XI - Cranial And Spinal)] : head turning and shoulder shrug symmetric [Cranial Nerves Hypoglossal (XII)] : there was no tongue deviation with protrusion [Motor Tone] : muscle tone was normal in all four extremities [Motor Strength] : muscle strength was normal in all four extremities [Motor Handedness Right-Handed] : the patient is right hand dominant [Sensation Tactile Decrease] : light touch was intact [Dysdiadochokinesia Bilaterally] : present bilaterally [Dysdiadochokinesia On The Left] : present on the left side [Coordination - Dysmetria Impaired Finger-to-Nose Left Only] : present on the left side [1+] : Brachioradialis left 1+ [2+] : Ankle jerk left 2+ [Dysdiadochokinesia On The Right] : present on the ride side [Coordination - Dysmetria Impaired Finger-to-Nose Right Only] : present on the ride side [Time] : disoriented to time [Short Term Intact] : short term memory impaired [Paresis Pronator Drift Right-Sided] : no pronator drift on the right [Paresis Pronator Drift Left-Sided] : no pronator drift on the left [Plantar Reflex Right Only] : normal on the right [Plantar Reflex Left Only] : normal on the left [FreeTextEntry4] : Date = March 1921. 1/3 delayed recall [FreeTextEntry1] : She has mild hypomimia, but normal volume of speech. Her voice was slightly slurred and monotonous. Extraocular movements were intact with slowed saccades, smooth pursuits, mild endgaze nystagmus, and no square wave jerks seen. \par \par Tone was mildly increased at neck and maybe right upper extremity. Rapid alternating movements are slightly irregular, and  finger tapping was bilaterally slowed. \par \par Foot tap was irregular. DAtaxia on FNF and bilateral HTS. She had no tremor. In wheelchair. Needs help to stand up, and cannot stand unsupported.\par  [FreeTextEntry8] : Gait wide-based. No ataxia on FNF

## 2022-04-26 NOTE — HISTORY OF PRESENT ILLNESS
[FreeTextEntry1] : The patient is a 67-year-old right-handed woman with cerebellar atrophy and imbalance. Her problems began about 2013 when she fell down the stairs and broke her ankle. She has a nephew with multiple sclerosis but no family history of cerebellar disease or other movement disorders, though additional history later revealed that an aunt noticed wide-based gait many years ago\par \par 1. One fall in November. No incontinence. Was coughing with food/drinks, had evaluation, no aspiration. Does not walk anymore. Voice lower. Food is blended, and more choking with water. Voice lower. \par 2. Saw genetics. Had testing for Friedreich's ataxia, lysosomal storages disease were normal. SCA28, DRPLA, ANO10, ESTEBAN, SCA3, ADCK3 negative, Indeed, comprehensive ataxia evaluation in 10/2019 was normal. \par 3. Prior to that, Radha scan 6/15/16 normal, I reviewed images. Vitamin E was normal, Mammogram normal, and gets them regularly. CT chest/abd/pelvis unremarkable, Paraneoplastic panel 10/24/15 - negative.  SPEP, endomysial antibodies, TTG, PTH normal.\par 3. Saw Dr Meza - sleep study at [place] consistent with RBD, repeat scheduled.. Never took anything. Daughter hears her yelling. Repeat study with Dr Meza did not show RBD, perhaps mild sleep apnea. \par 4. No longer on paxil, no more depression or anxiety. Trazodone 100 mg helps with sleep and mood. No hallucinations. More compulsive behavior (bits an Russian throw on her couch) \par 5. On mybetriq and forteo now\par

## 2022-04-26 NOTE — DISCUSSION/SUMMARY
[FreeTextEntry1] : In summary, the patient is a 66-year-old right-handed woman with a history of poor balance and falling. The examination was significant for minimal symmetric parkinsonism, minimal ataxia, except for cerebellar gait, and mildly slurred speech. 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 Genetic testing unrevealing. Paraneoplastic panel was negative, vitamine E was normal, CT of body was normal, and she gets regular mammograms (family hx of breast CT). Time course also argues against paraneoplastic. Genetic testing unrevealing. \par \par 1. Given compulsive behavior, will restart paxil\par 2. She does have more rigidity, so can try LD again, though it would help balance, as in some cases of OPCA it might work. \par 3. Continue trazodone for sleep and mood. She has sleep walked in the past on ambien, so will not use again. \par 4. Home care referral as she is completely dependent on help\par \par We discussed the above impression, plan and recommendations during the visit. Counseling represented more then 50% of the 30 minute visit time

## 2022-06-13 ENCOUNTER — RX RENEWAL (OUTPATIENT)
Age: 67
End: 2022-06-13

## 2022-06-13 RX ORDER — TRAZODONE HYDROCHLORIDE 50 MG/1
50 TABLET ORAL
Qty: 60 | Refills: 5 | Status: ACTIVE | COMMUNITY
Start: 2019-10-28 | End: 1900-01-01

## 2022-10-06 ENCOUNTER — NON-APPOINTMENT (OUTPATIENT)
Age: 67
End: 2022-10-06

## 2022-10-12 ENCOUNTER — RX RENEWAL (OUTPATIENT)
Age: 67
End: 2022-10-12

## 2022-10-12 RX ORDER — PAROXETINE HYDROCHLORIDE 10 MG/1
10 TABLET, FILM COATED ORAL
Qty: 30 | Refills: 5 | Status: ACTIVE | COMMUNITY
Start: 2018-07-03 | End: 1900-01-01

## 2022-10-12 RX ORDER — CARBIDOPA AND LEVODOPA 25; 100 MG/1; MG/1
25-100 TABLET ORAL
Qty: 180 | Refills: 5 | Status: ACTIVE | COMMUNITY
Start: 2022-04-26 | End: 1900-01-01

## 2022-10-26 ENCOUNTER — APPOINTMENT (OUTPATIENT)
Dept: NEUROLOGY | Facility: CLINIC | Age: 67
End: 2022-10-26